# Patient Record
Sex: FEMALE | Race: WHITE | NOT HISPANIC OR LATINO | URBAN - METROPOLITAN AREA
[De-identification: names, ages, dates, MRNs, and addresses within clinical notes are randomized per-mention and may not be internally consistent; named-entity substitution may affect disease eponyms.]

---

## 2019-01-21 ENCOUNTER — OFFICE (OUTPATIENT)
Dept: URBAN - METROPOLITAN AREA CLINIC 33 | Facility: CLINIC | Age: 59
End: 2019-01-21

## 2019-01-21 VITALS
WEIGHT: 143 LBS | TEMPERATURE: 97.7 F | HEART RATE: 80 BPM | SYSTOLIC BLOOD PRESSURE: 133 MMHG | DIASTOLIC BLOOD PRESSURE: 92 MMHG | HEIGHT: 59 IN

## 2019-01-21 DIAGNOSIS — R19.4 CHANGE IN BOWEL HABIT: ICD-10-CM

## 2019-01-21 DIAGNOSIS — R19.5 OTHER FECAL ABNORMALITIES: ICD-10-CM

## 2019-01-21 PROCEDURE — 00031: CPT

## 2019-01-21 PROCEDURE — 99214 OFFICE O/P EST MOD 30 MIN: CPT

## 2019-01-21 NOTE — SERVICEHPINOTES
PETE TABOR   is a   59  female who complains of change in bowel habits. She states in late Oct she believes she had some sort of viral gastroenteritis, n/v, diarrhea. Since then, she has not had normal BMs. BMs used to be 1-2x/day, BSS type 4. Now having several BMs per day with urgency, occasional cramping, BSS type 5-6. Mucus discharge and occasional brbpr when wiping. She often does not feel completely evacuated. She has never had a colonoscopy. Was having gas pains but this has resolved. She is currently finishing antibiotics for a UTI, has been on antibiotics a few times the past couple months for UTIs. No weight loss, fever. She has lost 30lbs intentionally through diet. No family hx of colon cancer, polyps, or IBD. Mother had diverticulitis. She has seen cardiologist a few times for palpitations last time was 4 months ago. States she has had holter monitor, stress test, echos, etc which all have been normal. No chest pain or SOB.

## 2019-01-21 NOTE — INTERFACERESULTNOTES
pt is aware of these results - see chart note from 2/3/19 (Dr. Michel); Rx for abx & florastor sent.

## 2019-02-01 LAB — CLOSTRIDIUM DIFFICILE TOXIN/GDH W/REFL TO PCR: ABNORMAL
